# Patient Record
Sex: MALE | Race: ASIAN | NOT HISPANIC OR LATINO
[De-identification: names, ages, dates, MRNs, and addresses within clinical notes are randomized per-mention and may not be internally consistent; named-entity substitution may affect disease eponyms.]

---

## 2022-10-04 ENCOUNTER — APPOINTMENT (OUTPATIENT)
Dept: PULMONOLOGY | Facility: CLINIC | Age: 57
End: 2022-10-04

## 2022-10-04 VITALS
BODY MASS INDEX: 27.89 KG/M2 | OXYGEN SATURATION: 99 % | HEIGHT: 68 IN | DIASTOLIC BLOOD PRESSURE: 92 MMHG | SYSTOLIC BLOOD PRESSURE: 144 MMHG | TEMPERATURE: 98.4 F | HEART RATE: 71 BPM | WEIGHT: 184 LBS

## 2022-10-04 DIAGNOSIS — I10 ESSENTIAL (PRIMARY) HYPERTENSION: ICD-10-CM

## 2022-10-04 DIAGNOSIS — J43.9 EMPHYSEMA, UNSPECIFIED: ICD-10-CM

## 2022-10-04 DIAGNOSIS — J98.4 EMPHYSEMA, UNSPECIFIED: ICD-10-CM

## 2022-10-04 DIAGNOSIS — R93.89 ABNORMAL FINDINGS ON DIAGNOSTIC IMAGING OF OTHER SPECIFIED BODY STRUCTURES: ICD-10-CM

## 2022-10-04 DIAGNOSIS — R06.02 SHORTNESS OF BREATH: ICD-10-CM

## 2022-10-04 DIAGNOSIS — D86.9 SARCOIDOSIS, UNSPECIFIED: ICD-10-CM

## 2022-10-04 DIAGNOSIS — Z87.891 PERSONAL HISTORY OF NICOTINE DEPENDENCE: ICD-10-CM

## 2022-10-04 DIAGNOSIS — R91.1 SOLITARY PULMONARY NODULE: ICD-10-CM

## 2022-10-04 PROBLEM — Z00.00 ENCOUNTER FOR PREVENTIVE HEALTH EXAMINATION: Status: ACTIVE | Noted: 2022-10-04

## 2022-10-04 PROCEDURE — 94060 EVALUATION OF WHEEZING: CPT

## 2022-10-04 PROCEDURE — 94729 DIFFUSING CAPACITY: CPT

## 2022-10-04 PROCEDURE — 94727 GAS DIL/WSHOT DETER LNG VOL: CPT

## 2022-10-04 PROCEDURE — 99203 OFFICE O/P NEW LOW 30 MIN: CPT | Mod: 25

## 2022-10-04 RX ORDER — ALBUTEROL SULFATE 90 UG/1
108 (90 BASE) INHALANT RESPIRATORY (INHALATION)
Refills: 0 | Status: ACTIVE | COMMUNITY
Start: 2022-10-04

## 2022-10-04 RX ORDER — ALBUTEROL SULFATE 90 UG/1
108 (90 BASE) INHALANT RESPIRATORY (INHALATION)
Qty: 1 | Refills: 3 | Status: ACTIVE | COMMUNITY
Start: 2022-10-04 | End: 1900-01-01

## 2022-10-05 PROBLEM — R93.89 ABNORMAL CHEST CT: Status: ACTIVE | Noted: 2022-10-05

## 2022-10-05 PROBLEM — D86.9 SARCOIDOSIS: Status: ACTIVE | Noted: 2022-10-05

## 2022-10-05 PROBLEM — J43.9 MIXED RESTRICTIVE AND OBSTRUCTIVE LUNG DISEASE: Status: ACTIVE | Noted: 2022-10-05

## 2022-10-05 NOTE — PROCEDURE
[FreeTextEntry1] : PFT results:Mild to moderate restriction with normal diffusion. Mild obstruction with bronchodilator response noted

## 2022-10-05 NOTE — HISTORY OF PRESENT ILLNESS
[Former] : former [< 20 pack-years] : < 20 pack-years [TextBox_4] : 57 yo male with abnormal chest CT findings presents for evaluation. The patient patient was evaluated last month in the ER at [facility] for chest discomfort and "burning". He subsequently had EBUS which revealed granulomatous inflammation of multiple lymph nodes. The patient complains of PRN dry cough without SOB, chest pain, hemoptysis, night sweats or weight loss. He has been treated in the past with inhaled meds, last used one year ago.He has a remote history of smoking, having stopped 30 years ago. [YearQuit] : 1990 [TextBox_29] : Denies snoring, daytime somnolence, apneic episodes, AM headaches

## 2022-10-05 NOTE — DISCUSSION/SUMMARY
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 The patient has minimal pulmonary complains without systemic complaints, thus steroid or other treatment is not indicated at present. Cardiac evaluation with cardiac MRI is indicated as well as a slit lamp eye exam. PFT and chest CT will be repeated in the future. He is to use albuterol MDI PRN. Treatment adjustment will depend on symptomatic needs.He is to follow up with his PMD as before and for the influenza vaccine. I spoke to his son over the telephone.

## 2023-04-11 ENCOUNTER — APPOINTMENT (OUTPATIENT)
Dept: PULMONOLOGY | Facility: CLINIC | Age: 58
End: 2023-04-11